# Patient Record
Sex: MALE | Race: WHITE | NOT HISPANIC OR LATINO | Employment: UNEMPLOYED
[De-identification: names, ages, dates, MRNs, and addresses within clinical notes are randomized per-mention and may not be internally consistent; named-entity substitution may affect disease eponyms.]

---

## 2024-06-13 ENCOUNTER — TRANSCRIBE ORDERS (OUTPATIENT)
Dept: OTHER | Age: 54
End: 2024-06-13

## 2024-06-13 DIAGNOSIS — T75.4XXA ELECTROCUTION: Primary | ICD-10-CM

## 2024-06-13 DIAGNOSIS — G89.29 CHRONIC LEFT SHOULDER PAIN: ICD-10-CM

## 2024-06-13 DIAGNOSIS — M25.512 CHRONIC LEFT SHOULDER PAIN: ICD-10-CM

## 2024-06-13 DIAGNOSIS — G25.2 RESTING TREMOR: ICD-10-CM

## 2024-06-26 PROBLEM — S46.012A TRAUMATIC COMPLETE TEAR OF LEFT ROTATOR CUFF: Status: ACTIVE | Noted: 2023-04-22

## 2024-06-26 PROBLEM — M54.42 CHRONIC BILATERAL LOW BACK PAIN WITH BILATERAL SCIATICA: Status: ACTIVE | Noted: 2023-11-24

## 2024-06-26 PROBLEM — G25.2 RESTING TREMOR: Status: ACTIVE | Noted: 2024-01-05

## 2024-06-26 PROBLEM — G47.33 OSA (OBSTRUCTIVE SLEEP APNEA): Status: ACTIVE | Noted: 2017-04-30

## 2024-06-26 PROBLEM — F32.9 REACTIVE DEPRESSION: Status: ACTIVE | Noted: 2023-11-24

## 2024-06-26 PROBLEM — I10 ESSENTIAL HYPERTENSION: Status: ACTIVE | Noted: 2023-04-21

## 2024-06-26 PROBLEM — R73.03 PREDIABETES: Status: ACTIVE | Noted: 2023-06-29

## 2024-06-26 PROBLEM — E04.1 THYROID NODULE: Status: ACTIVE | Noted: 2024-04-22

## 2024-06-26 PROBLEM — R63.4 WEIGHT LOSS: Status: ACTIVE | Noted: 2023-06-19

## 2024-06-26 PROBLEM — M54.41 CHRONIC BILATERAL LOW BACK PAIN WITH BILATERAL SCIATICA: Status: ACTIVE | Noted: 2023-11-24

## 2024-06-26 PROBLEM — T75.4XXA ELECTROCUTION: Status: ACTIVE | Noted: 2022-08-05

## 2024-06-26 PROBLEM — R07.89 CHEST PAIN, RADIATING: Status: ACTIVE | Noted: 2022-08-04

## 2024-06-26 PROBLEM — G89.29 CHRONIC BILATERAL LOW BACK PAIN WITH BILATERAL SCIATICA: Status: ACTIVE | Noted: 2023-11-24

## 2024-06-26 PROBLEM — R73.09 ELEVATED HEMOGLOBIN A1C: Status: ACTIVE | Noted: 2024-02-09

## 2024-06-26 PROBLEM — R29.898 WEAKNESS OF LEFT UPPER EXTREMITY: Status: ACTIVE | Noted: 2024-04-22

## 2024-06-26 RX ORDER — IRBESARTAN AND HYDROCHLOROTHIAZIDE 150; 12.5 MG/1; MG/1
1 TABLET, FILM COATED ORAL DAILY
COMMUNITY
Start: 2023-01-27 | End: 2026-05-11

## 2024-06-26 RX ORDER — PREGABALIN 75 MG/1
1 CAPSULE ORAL 2 TIMES DAILY
COMMUNITY
Start: 2023-04-18 | End: 2024-07-23

## 2024-06-26 RX ORDER — FUROSEMIDE 20 MG
1 TABLET ORAL DAILY
COMMUNITY
Start: 2023-06-19

## 2024-06-26 RX ORDER — ESCITALOPRAM OXALATE 10 MG/1
1 TABLET ORAL DAILY
COMMUNITY
Start: 2023-02-17

## 2024-06-26 RX ORDER — METHOCARBAMOL 750 MG/1
750 TABLET, FILM COATED ORAL 4 TIMES DAILY PRN
COMMUNITY
Start: 2023-11-13

## 2024-06-26 RX ORDER — PREGABALIN 150 MG/1
1 CAPSULE ORAL 2 TIMES DAILY
COMMUNITY
Start: 2024-06-04

## 2024-06-26 RX ORDER — BUPROPION HYDROCHLORIDE 150 MG/1
150 TABLET ORAL EVERY MORNING
COMMUNITY
Start: 2023-11-24 | End: 2024-07-23

## 2024-06-26 RX ORDER — PHENTERMINE HYDROCHLORIDE 15 MG/1
15 CAPSULE ORAL EVERY MORNING
COMMUNITY
Start: 2024-03-08
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
## 2024-07-11 NOTE — TELEPHONE ENCOUNTER
Action 7/11/24 MV 9.47am   Action Taken 1) called pt to obtain verbal consent for [facility] + RED notes, LVM  2) imaging request faxed to [facility]     Action 7/12/24 MV 8.56am   Action Taken 1) Patient called back and provided verbal consent for [facility] and [facility] recs. When asked if pt has any images, pt stated no. He was supposed to get brain imaging but decided not to  2) images resolved in PACS       RECORDS RECEIVED FROM: external   REASON FOR VISIT: Tremor,    PROVIDER: Dr. Mike Restrepo   DATE OF APPT: 7/23/24   NOTES (FOR ALL VISITS) STATUS DETAILS   OFFICE NOTE from referring provider [facility] Every[facility]:  6/4/24 4/16/24   DISCHARGE REPORT from the ER Care Everywhere [facility]:  8/1/22   EMG Care Everywhere [facility]:  2/21/23 2/14/19   MEDICATION LIST Care Everywhere    IMAGING  (FOR ALL VISITS)     CT (HEAD, NECK, SPINE) PACS [facility]:  CT Head 3/20/24  CT Cervical Spine 3/20/24

## 2024-07-23 ENCOUNTER — OFFICE VISIT (OUTPATIENT)
Dept: NEUROLOGY | Facility: CLINIC | Age: 54
End: 2024-07-23
Attending: PHYSICIAN ASSISTANT
Payer: COMMERCIAL

## 2024-07-23 ENCOUNTER — PRE VISIT (OUTPATIENT)
Dept: NEUROLOGY | Facility: CLINIC | Age: 54
End: 2024-07-23

## 2024-07-23 VITALS
OXYGEN SATURATION: 99 % | DIASTOLIC BLOOD PRESSURE: 92 MMHG | SYSTOLIC BLOOD PRESSURE: 139 MMHG | HEART RATE: 87 BPM | RESPIRATION RATE: 22 BRPM

## 2024-07-23 DIAGNOSIS — R25.1 TREMOR: Primary | ICD-10-CM

## 2024-07-23 PROBLEM — G47.9 SLEEP DISTURBANCES: Status: ACTIVE | Noted: 2024-06-24

## 2024-07-23 PROCEDURE — G2211 COMPLEX E/M VISIT ADD ON: HCPCS | Performed by: PSYCHIATRY & NEUROLOGY

## 2024-07-23 PROCEDURE — 99205 OFFICE O/P NEW HI 60 MIN: CPT | Performed by: PSYCHIATRY & NEUROLOGY

## 2024-07-23 RX ORDER — BUPROPION HYDROCHLORIDE 300 MG/1
300 TABLET ORAL EVERY MORNING
COMMUNITY
Start: 2024-06-06

## 2024-07-23 RX ORDER — TRAZODONE HYDROCHLORIDE 100 MG/1
1 TABLET ORAL AT BEDTIME
COMMUNITY
Start: 2024-06-24

## 2024-07-23 ASSESSMENT — PAIN SCALES - GENERAL: PAINLEVEL: MODERATE PAIN (5)

## 2024-07-23 NOTE — NURSING NOTE
Chief Complaint   Patient presents with    Consult For     BP (!) 139/92 (BP Location: Right arm, Patient Position: Sitting, Cuff Size: Adult Large)   Pulse 87   Resp 22   SpO2 99%     TONYA RADHA
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
## 2024-07-23 NOTE — PATIENT INSTRUCTIONS
"  Assessment:  (R25.1) Tremor  (primary encounter diagnosis)  Resting tremor  Pain  See referral notes below.     Welding in a dump cart and had his arm against a wet evi and blew out his arm and  this was June 24, 2022  He works for [employer]. He called the office and was told to go to the nurse's station and did not go till the next morning.   \"Blew out his arm\" from welding timoteo.     The tremors have been there for a while. Were there after his shoulder procedure.     He lost strength in his left arm  He had some chest pain and went in a few times and had his heart cleared.   He had a left rotator cuff surgery and after the surgery he noticed left h and tremor.  The surgery was about 5/11/2023  He had some burning and numbness in his left hand.     He was on carbidopa/levodopa for 6 weeks and has been seen by Kristen Mead  He did not noticed relief.   He had been on sinemet.       Review of diagnosis    Tremor  Has burning and tightness in his chest    Avoidance of dopamine blockers   Not taking    Motor complication review   N/a    Review of Impulse control disorders   N/a    Review of surgical or medication options   reviewed    Gait/Balance/Falls   Had one fall off the deck and broke his tailbone - this was April - dog got between his legs.     Exercise/Therapy performed/offered   He can walk a 1/2 mile  - h as some stumbling     Cognitive/Driving   Can drive - does not get lost     Mood   Depression  Andrew key has prescribed medications  Bupropion was increased to 300mg   Escitalopram lexapro 10 mg - which was added  Had been on another medication.   Electrocution     20  years   1st marriage   Wife had a son from a prior relationship  - step son  They were unable to have children and adopted a child  There were problems with having a child on both sides including azospermia     Mat is 28 and working and living at home  Magui Alvarez is going to be a senior this year.     Hallucinations/delusions "   no    Sleep   Sleep apnea - cpap starting a month ago.   Sharing bed  Has variable problems falling asleep depending on his pain  He has pain   No night time behaviors or issues  Tries to get to bed at 10 or 11pm and may fall asleep in a chair at times  Gets up at 430/5p     Bladder/Renal/Prostate/Gyn/Other   No problems  Nocturia - 1/noc    GI/Constipation/GERD   Obesity   Phentermine - slowly going down on his weight  No constipation or hearburn     ENDO/Lipid/DM/Bone density/Thyroid  Elevated A1c, prediabetes  Last level was 5.9 as of 6/24/2024    Thyroid nodule - biopsy normal    Lipid disorder - not taking medication   Slight abnormalities     Cardio/heart/Hyper or Hypotensive   Hypertension  Slightly high today   Furosemide  Irbesartea-.hydrochlorthiazide   No swelling in legs     Vision/Dry Eyes/Cataracts/Glaucoma/Macular   Glasses  No eye problems.     Heme/Anticoagulation/Antiplatelet/Anemia/Other  No bleeding issues  Not on aspirin    ENT/Resp  Slight loss of smell; denies changes in taste  Had covid once  No breathing problems    Skin/Cancer/Seborrhea/other  No cancer    Musculoskeletal/Pain/Headache  Left rotator cuff  Weak left arm   Bilateral low back pain with bilateral sciatica    Other:      Medications            Bupropion wellbutrin XL 150mg 24hr 1       Diclofenac voltaren 1% gel  prn       Escitalopram lexapro 10mg 1       Furosemide lasix 20mg  1       Irbesartan hydrochlorothiazide avalide 150 12.5 1       Methocarbamol robaxin 750mg  1       Phentermine 15mg 1       Pregabalin lyrica 150mg  1  1     Tizanidine zanaflex 4mg 2    2    Trazodone 100mg    1                                                                              Examination demonstrated no stiffness or slowness and he has normal arm swing and he has a distractible tremor and that it entrained. He has no vocal or head or tongue tremor.     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     We recommend using the provided resources as well as the following book that includes cognitive behavioral therapy strategies.      There are no medications that specifically can resolve the tremor and so we tend to not prescribe medications for his symptoms.     Overcoming Functional Neurological Symptoms: A Five Areas Approach 1st Edition by Mehran Soliz    He signed the consent to communicate form with his wife     He would benefit from working with a counsellor to help in manage the anger and other symptoms that he has been experiencing - including depression.     Return as needed - no scheduled return visit was made.     He may benefit from pain management     Ally joined us today to review the diagnosis and management.     Could consider biofeedback.

## 2024-07-28 ENCOUNTER — HEALTH MAINTENANCE LETTER (OUTPATIENT)
Age: 54
End: 2024-07-28

## 2025-08-10 ENCOUNTER — HEALTH MAINTENANCE LETTER (OUTPATIENT)
Age: 55
End: 2025-08-10